# Patient Record
Sex: FEMALE
[De-identification: names, ages, dates, MRNs, and addresses within clinical notes are randomized per-mention and may not be internally consistent; named-entity substitution may affect disease eponyms.]

---

## 2018-08-17 ENCOUNTER — HOSPITAL ENCOUNTER (OUTPATIENT)
Dept: HOSPITAL 59 - HOP | Age: 58
Discharge: HOME | End: 2018-08-17
Attending: INTERNAL MEDICINE
Payer: MEDICAID

## 2018-08-17 DIAGNOSIS — Z12.11: Primary | ICD-10-CM

## 2018-08-17 DIAGNOSIS — D12.7: ICD-10-CM

## 2018-08-20 NOTE — OPERATIVE NOTE
DATE OF SURGERY: 08/17/2018



SURGEON: Cipriano Berumen MD  



OPERATION: COLONOSCOPY. 



INDICATIONS: This is a 58-year-old female with average risk for colorectal cancer who presented for 
screening colonoscopy. 



POSTOPERATIVE DIAGNOSES:

1. Fifteen 5-6 mm sessile rectosigmoid polyps that were removed by cold snare. 

2. Otherwise normal sedation. 



ANESTHESIA: Sedation is per Anesthesia. Pulse oximetry was monitored throughout the procedure to 
maintain O2 saturation of 90% or greater. Supplemental oxygen was administered via nasal cannula. 
Cardiac and vital signs were monitored throughout the duration of the procedure, and they were 
stable. 



The procedure of colonoscopy and risks and alternatives of the procedure, including the risk of 
bleeding and perforation, among others, were explained to the patient who voiced understanding and 
agreed to have the procedure done. Physical examination was performed, and the patient was found 
stable for sedation. 



PROCEDURE: The patient was placed in the left lateral position. Sedation was initiated. A digital 
rectal exam was performed and showed some mild external hemorrhoids with no palpable rectal masses. 
An Olympus PCF-180AL colonoscope was then inserted into the rectum under direct visualization. It 
was advanced to the cecum without difficulty. The ileocecal valve and appendiceal orifice were 
identified and photographed. The colonic mucosa was carefully examined upon introduction of the 
colonoscope. In the rectosigmoid colon were fifteen 5-6 mm sessile polyps that were noted and they 
were removed by cold snare. There were no other lesions noted. The bowel preparation was good. The 
colonoscope was then withdrawn while carefully examining the colonic mucosal surfaces. No other 
lesions were noted. In the rectum, retroflexion was performed and the patient had polyps noted and 
they were removed by cold snare. The colonoscope was then withdrawn and the procedure was 
terminated. The patient tolerated the procedure well without any immediate complications. The 
patient remained with stable vital signs and was transferred to the recovery room.



RECOMMENDATIONS: 

1. The patient should be on a high-fiber diet. 

2. The patient is to have a repeat colonoscopy for surveillance in 3 or 5 or 10 years depending on 
the histology of the polyps.  



Thank you for allowing me to participate in the care of your patient. CC: GEMA Norman